# Patient Record
Sex: MALE | Race: WHITE
[De-identification: names, ages, dates, MRNs, and addresses within clinical notes are randomized per-mention and may not be internally consistent; named-entity substitution may affect disease eponyms.]

---

## 2018-11-16 NOTE — ER REPORT
History and Physical


Time Seen By MD:  03:22


Hx. of Stated Complaint:  


senior care CLEARANCE, ETOH INTOXICATION


HPI/ROS


CHIEF COMPLAINT: assisted clearance





HISTORY OF PRESENT ILLNESS: 22-year-old male brought in by police for penitentiary 


clearance.  Patient voices no complaints.  Patient denies significant past 


medical history.  He admits to heavy alcohol ingestion.  He has slurred speech 


consistent with alcohol intoxication.





REVIEW OF SYSTEMS:


Respiratory: No cough, no dyspnea.


Cardiovascular: No chest pain, no palpitations.


Gastrointestinal: No vomiting, no abdominal pain.


Musculoskeletal: No back pain.


Allergies:  


Coded Allergies:  


     No Known Drug Allergies (Unverified , 11/16/18)


Home Meds


Reported Medications


Amphet Asp/Amphet/D-Amphet (ADDERALL 10 MG TABLET) 10 Mg Tablet, 10 MG PO PRN


   11/16/18


Amphet Asp/Amphet/D-Amphet (ADDERALL XR 20 MG CAPSULE) 20 Mg Cap.er.24h, 20 MG 


PO QAM


   11/16/18


Reviewed Nurses Notes:  Yes


Old Medical Records Reviewed:  Yes


Hx Substance Use Disorder:  No


Hx Alcohol Use:  No


Constitutional





Vital Sign - Last 24 Hours








 11/16/18





 03:18


 


Temp 97.5


 


Pulse 110


 


Resp 16


 


B/P (MAP) 136/104


 


Pulse Ox 95


 


O2 Delivery Room Air








Physical Exam


  General Appearance: The patient is alert, has no immediate need for airway 


protection and no current signs of toxicity.  Palpation of the head and neck 


reveal no tenderness or trauma


HEENT: Pupils equal and round no injection.  TMs normal, oropharynx without 


dental trauma


Respiratory: Chest is non tender, lungs are clear to auscultation.  No chest 


wall tenderness, no marks or bruising


Cardiac: regular rate and rhythm


Gastrointestinal: Abdomen is soft and non tender, no masses, bowel sounds 


normal.


Musculoskeletal:  Neck: Neck is supple and non tender.


Extremities have full range of motion and are non tender.


Skin: No rashes or lesions.





DIFFERENTIAL DIAGNOSIS: After history and physical exam differential diagnosis 


was considered for penitentiary clearance, alcohol intoxication, polysubstance abuse





Medical Decision Making


ED Course/Re-evaluation


ED Course


Patient was minute to an examination room.  H&P was done.  The differential 


diagnoses was considered.  On clinical examination.  Patient has no findings.  


His vital signs are stable.  He voices no complaints.  He is medical cleared for


penitentiary admission.


Decision to Disposition Date:  Nov 16, 2018


Decision to Disposition Time:  03:28





Depart


Departure


Latest Vital Signs





Vital Signs








  Date Time  Temp Pulse Resp B/P (MAP) Pulse Ox O2 Delivery O2 Flow Rate FiO2


 


11/16/18 03:18 97.5 110 16 136/104 95 Room Air  








Impression:  


   Primary Impression:  


   Medical clearance for incarceration


   Additional Impression:  


   Alcohol intoxication


Condition:  Improved


Disposition:  [facility] TO senior care/CORRECTIONAL F


Patient Instructions:  Alcohol Intoxication (ED)





Additional Instructions:  


Medical cleared for penitentiary admission





Problem Qualifiers








   Additional Impression:  


   Alcohol intoxication


   Complication of substance-induced condition:  uncomplicated  Qualified Codes:


    F10.920 - Alcohol use, unspecified with intoxication, uncomplicated








CARSON OSMAN DO              Nov 16, 2018 03:28

## 2019-02-06 ENCOUNTER — HOSPITAL ENCOUNTER (OUTPATIENT)
Dept: HOSPITAL 89 - LAB | Age: 24
End: 2019-02-06
Attending: NURSE PRACTITIONER
Payer: COMMERCIAL

## 2019-02-06 DIAGNOSIS — R07.9: Primary | ICD-10-CM

## 2019-02-06 DIAGNOSIS — R06.00: ICD-10-CM

## 2019-02-06 LAB — PLATELET COUNT, AUTOMATED: 296 K/UL (ref 150–450)

## 2019-02-06 PROCEDURE — 85379 FIBRIN DEGRADATION QUANT: CPT

## 2019-02-06 PROCEDURE — 82947 ASSAY GLUCOSE BLOOD QUANT: CPT

## 2019-02-06 PROCEDURE — 84295 ASSAY OF SERUM SODIUM: CPT

## 2019-02-06 PROCEDURE — 84132 ASSAY OF SERUM POTASSIUM: CPT

## 2019-02-06 PROCEDURE — 71046 X-RAY EXAM CHEST 2 VIEWS: CPT

## 2019-02-06 PROCEDURE — 82247 BILIRUBIN TOTAL: CPT

## 2019-02-06 PROCEDURE — 82040 ASSAY OF SERUM ALBUMIN: CPT

## 2019-02-06 PROCEDURE — 84155 ASSAY OF PROTEIN SERUM: CPT

## 2019-02-06 PROCEDURE — 36415 COLL VENOUS BLD VENIPUNCTURE: CPT

## 2019-02-06 PROCEDURE — 84075 ASSAY ALKALINE PHOSPHATASE: CPT

## 2019-02-06 PROCEDURE — 82310 ASSAY OF CALCIUM: CPT

## 2019-02-06 PROCEDURE — 84450 TRANSFERASE (AST) (SGOT): CPT

## 2019-02-06 PROCEDURE — 82565 ASSAY OF CREATININE: CPT

## 2019-02-06 PROCEDURE — 85025 COMPLETE CBC W/AUTO DIFF WBC: CPT

## 2019-02-06 PROCEDURE — 84520 ASSAY OF UREA NITROGEN: CPT

## 2019-02-06 PROCEDURE — 82374 ASSAY BLOOD CARBON DIOXIDE: CPT

## 2019-02-06 PROCEDURE — 82435 ASSAY OF BLOOD CHLORIDE: CPT

## 2019-02-06 PROCEDURE — 84460 ALANINE AMINO (ALT) (SGPT): CPT

## 2019-02-06 NOTE — RADIOLOGY IMAGING REPORT
FACILITY: [facility] 

 

PATIENT NAME: Joaquin Moreno

: 1995

MR: [id number]

V: [id number]

EXAM DATE: 

ORDERING PHYSICIAN: DO MULLER

TECHNOLOGIST: 

 

Location: [facility]

Patient: Joaquin Moreno

: 1995

MRN: [id number]

Visit/Account:[id number]

Date of Sevice:  2019

 

ACCESSION #: [id number]

 

Exam type: CHEST PA LAT

 

History: Chest pain, dyspnea x2 days, history of asthma

 

Comparison: None.

 

Findings:

 

The lungs are free of acute effusions, infiltrates or edema.  There is no evidence of a pneumothorax 
or pneumomediastinum.  The cardiac swelling is normal in size.  The trachea is in midline.

 

IMPRESSION:

 

1.  No acute cardiopulmonary process is seen

 

Report Dictated By: Cristina Richter MD at 2019 3:42 PM

 

Report E-Signed By: Cristina Richter MD  at 2019 3:42 PM

 

WSN:CHANTEL